# Patient Record
Sex: FEMALE | Race: WHITE
[De-identification: names, ages, dates, MRNs, and addresses within clinical notes are randomized per-mention and may not be internally consistent; named-entity substitution may affect disease eponyms.]

---

## 2018-12-27 ENCOUNTER — HOSPITAL ENCOUNTER (OUTPATIENT)
Dept: HOSPITAL 39 - MAMMO | Age: 68
End: 2018-12-27
Attending: FAMILY MEDICINE
Payer: MEDICARE

## 2018-12-27 DIAGNOSIS — R92.8: Primary | ICD-10-CM

## 2018-12-27 NOTE — US
EXAM DESCRIPTION: 

Breast,Right: Ultrasound



CLINICAL HISTORY: 

68 yearsFemaleABNORMAL MAMMO



COMPARISON: 

Digital screening tomosynthesis  bilateral breast 11/27/2018. 



TECHNIQUE: 

Transcutaneous scanning of the upper anterior right breast

utilizing gray-scale and Doppler modes. Scanning performed by the

sonographer; exam was reviewed at remote location by Dr. Hsu.



FINDINGS: 

Scanning of the anterior third upper right breast. Heterogeneous

fibroglandular and fatty echotexture. Hypoechoic circumscribed

mass with central echogenicity but no significant vascularity at

the 12:00 position with dimensions 5.1 x 3.3 x 4.8 mm. Parallel

orientation and mostly posterior enhancement features. Second

mass with Mostly anechoic structure with circumscribed and

lobulated margins wider than tall orientation and some images

measuring 4.0 x 4.0 x 3.5 mm. Adjacent to the previously

described mass. Nonvascular, posterior enhancement features. May

represent 2 adjoining cysts. No dominant, abnormal appearing,

solid mass. No parenchymal edema or large calcifications. No

overlying skin changes. Normal vascularity.



IMPRESSION: 

BI-RADS CATEGORY: 3 - PROBABLY BENIGN.

Management: Short interval (6-month) follow-up with targeted

right breast ultrasound and continued 6 month surveillance

digital right breast mammography.



The FINDINGS and the FOLLOW-UP plan were reviewed in person with

the patient after the examination. Written communication

explaining the IMPRESSION and FOLLOW-UP will be mailed to the

patient and referring care provider.



Electronically signed by:  Clayton Hsu MD  12/27/2018 5:45 PM

[facility] Workstation: [id number]

## 2021-01-14 ENCOUNTER — HOSPITAL ENCOUNTER (OUTPATIENT)
Dept: HOSPITAL 39 - RAD | Age: 71
End: 2021-01-14
Attending: ORTHOPAEDIC SURGERY
Payer: MEDICARE

## 2021-01-14 DIAGNOSIS — M79.641: Primary | ICD-10-CM

## 2021-01-14 DIAGNOSIS — M79.642: ICD-10-CM

## 2021-01-14 NOTE — RAD
EXAM DESCRIPTION: Hand,Right 3 Views



CLINICAL HISTORY: 70 years Female, PAIN



COMPARISON: None.



Findings: 3 view(s)/radiograph(s)



Moderate first CMC osteoarthritis. Moderate osteoarthritis of the

first metacarpal row. Osteopenia. No acute fracture or

dislocation. No focal soft tissue swelling. Moderate scattered IP

osteoarthritis. No osseous erosion.



IMPRESSION:

Degenerative changes in the right hand. No acute osseous

abnormality.



Electronically signed by:  Carloz Arenas MD  1/14/2021 3:26 PM

[facility] Workstation: [id number]

## 2021-01-14 NOTE — RAD
EXAM DESCRIPTION: Hand,Left 3 Views



CLINICAL HISTORY: 70 years Female, PAIN



COMPARISON: None.



Findings: 3 view(s)/radiograph(s)



Moderate first CMC osteoarthritis. Severe osteoarthritis of the

proximal carpal row. Osteopenia. No acute fracture or

dislocation. No focal soft tissue swelling. No osseous erosion.

Mild scattered IP osteoarthritis.



IMPRESSION:

Degenerative changes in the left hand. No acute osseous

abnormality.



Electronically signed by:  Carloz Arenas MD  1/14/2021 3:25 PM

[facility] Workstation: [id number]